# Patient Record
Sex: FEMALE | Race: WHITE | NOT HISPANIC OR LATINO | Employment: FULL TIME
[De-identification: names, ages, dates, MRNs, and addresses within clinical notes are randomized per-mention and may not be internally consistent; named-entity substitution may affect disease eponyms.]

---

## 2019-02-18 ENCOUNTER — RECORDS - HEALTHEAST (OUTPATIENT)
Dept: ADMINISTRATIVE | Facility: OTHER | Age: 29
End: 2019-02-18

## 2019-04-10 ENCOUNTER — RECORDS - HEALTHEAST (OUTPATIENT)
Dept: ADMINISTRATIVE | Facility: OTHER | Age: 29
End: 2019-04-10

## 2019-04-15 ENCOUNTER — RECORDS - HEALTHEAST (OUTPATIENT)
Dept: ADMINISTRATIVE | Facility: OTHER | Age: 29
End: 2019-04-15

## 2019-04-17 ENCOUNTER — RECORDS - HEALTHEAST (OUTPATIENT)
Dept: ADMINISTRATIVE | Facility: OTHER | Age: 29
End: 2019-04-17

## 2019-04-19 ENCOUNTER — COMMUNICATION - HEALTHEAST (OUTPATIENT)
Dept: ONCOLOGY | Facility: CLINIC | Age: 29
End: 2019-04-19

## 2019-04-22 ENCOUNTER — COMMUNICATION - HEALTHEAST (OUTPATIENT)
Dept: ONCOLOGY | Facility: CLINIC | Age: 29
End: 2019-04-22

## 2019-05-15 ENCOUNTER — OFFICE VISIT - HEALTHEAST (OUTPATIENT)
Dept: ONCOLOGY | Facility: HOSPITAL | Age: 29
End: 2019-05-15

## 2019-05-15 DIAGNOSIS — I82.402 DEEP VEIN THROMBOSIS (DVT) OF LEFT LOWER EXTREMITY, UNSPECIFIED CHRONICITY, UNSPECIFIED VEIN (H): ICD-10-CM

## 2019-05-15 DIAGNOSIS — I82.4Y2 DVT, LOWER EXTREMITY, PROXIMAL, ACUTE, LEFT (H): ICD-10-CM

## 2019-05-15 ASSESSMENT — MIFFLIN-ST. JEOR: SCORE: 1693.85

## 2019-06-07 ENCOUNTER — AMBULATORY - HEALTHEAST (OUTPATIENT)
Dept: ONCOLOGY | Facility: HOSPITAL | Age: 29
End: 2019-06-07

## 2019-06-07 DIAGNOSIS — I82.4Y2 DVT, LOWER EXTREMITY, PROXIMAL, ACUTE, LEFT (H): ICD-10-CM

## 2019-06-11 ENCOUNTER — COMMUNICATION - HEALTHEAST (OUTPATIENT)
Dept: ONCOLOGY | Facility: HOSPITAL | Age: 29
End: 2019-06-11

## 2019-06-12 ENCOUNTER — RECORDS - HEALTHEAST (OUTPATIENT)
Dept: LAB | Facility: CLINIC | Age: 29
End: 2019-06-12

## 2019-06-13 LAB
ALLERGIC TO PENICILLIN: NORMAL
GP B STREP DNA SPEC QL NAA+PROBE: NEGATIVE
T PALLIDUM AB SER QL: NEGATIVE

## 2019-06-20 ENCOUNTER — AMBULATORY - HEALTHEAST (OUTPATIENT)
Dept: INFUSION THERAPY | Facility: HOSPITAL | Age: 29
End: 2019-06-20

## 2019-06-20 ENCOUNTER — COMMUNICATION - HEALTHEAST (OUTPATIENT)
Dept: ONCOLOGY | Facility: HOSPITAL | Age: 29
End: 2019-06-20

## 2019-06-20 DIAGNOSIS — I82.4Y2 DVT, LOWER EXTREMITY, PROXIMAL, ACUTE, LEFT (H): ICD-10-CM

## 2019-06-20 LAB — UFH PPP CHRO-ACNC: 0.56 IU/ML (ref 0.3–0.7)

## 2019-06-26 ENCOUNTER — RECORDS - HEALTHEAST (OUTPATIENT)
Dept: ADMINISTRATIVE | Facility: OTHER | Age: 29
End: 2019-06-26

## 2019-06-28 ENCOUNTER — HOSPITAL ENCOUNTER (OUTPATIENT)
Dept: OBGYN | Facility: HOSPITAL | Age: 29
Discharge: HOME | End: 2019-06-28

## 2019-06-30 ENCOUNTER — ANESTHESIA - HEALTHEAST (OUTPATIENT)
Dept: OBGYN | Facility: HOSPITAL | Age: 29
End: 2019-06-30

## 2019-07-02 ENCOUNTER — HOME CARE/HOSPICE - HEALTHEAST (OUTPATIENT)
Dept: HOME HEALTH SERVICES | Facility: HOME HEALTH | Age: 29
End: 2019-07-02

## 2019-08-14 ENCOUNTER — RECORDS - HEALTHEAST (OUTPATIENT)
Dept: LAB | Facility: CLINIC | Age: 29
End: 2019-08-14

## 2019-08-16 LAB — BACTERIA SPEC CULT: NORMAL

## 2019-11-11 ENCOUNTER — COMMUNICATION - HEALTHEAST (OUTPATIENT)
Dept: ONCOLOGY | Facility: HOSPITAL | Age: 29
End: 2019-11-11

## 2019-11-12 ENCOUNTER — COMMUNICATION - HEALTHEAST (OUTPATIENT)
Dept: ONCOLOGY | Facility: HOSPITAL | Age: 29
End: 2019-11-12

## 2019-12-23 ENCOUNTER — AMBULATORY - HEALTHEAST (OUTPATIENT)
Dept: INFUSION THERAPY | Facility: HOSPITAL | Age: 29
End: 2019-12-23

## 2019-12-23 DIAGNOSIS — I82.402 DEEP VEIN THROMBOSIS (DVT) OF LEFT LOWER EXTREMITY, UNSPECIFIED CHRONICITY, UNSPECIFIED VEIN (H): ICD-10-CM

## 2019-12-24 LAB
AT III ACT/NOR PPP CHRO: 104 % (ref 85–135)
FACT VIII ACT/NOR PPP: 144 % (ref 55–200)
PROT C ACT/NOR PPP CHRO: 117 % (ref 70–170)

## 2019-12-26 LAB — LA PPP-IMP: NEGATIVE

## 2019-12-27 LAB
B2 GLYCOPROT1 IGG SERPL IA-ACNC: <0.6 U/ML
B2 GLYCOPROT1 IGM SERPL IA-ACNC: <2.9 U/ML
FACTOR 5 LEIDEN MUTAT PCR - HISTORICAL: NORMAL
PROTHROMBIN 20210A BY PCR - HISTORICAL: NORMAL

## 2019-12-30 ENCOUNTER — OFFICE VISIT - HEALTHEAST (OUTPATIENT)
Dept: ONCOLOGY | Facility: HOSPITAL | Age: 29
End: 2019-12-30

## 2019-12-30 DIAGNOSIS — I82.4Y2 DVT, LOWER EXTREMITY, PROXIMAL, ACUTE, LEFT (H): ICD-10-CM

## 2019-12-30 ASSESSMENT — MIFFLIN-ST. JEOR: SCORE: 1662.1

## 2020-03-19 ENCOUNTER — RECORDS - HEALTHEAST (OUTPATIENT)
Dept: LAB | Facility: CLINIC | Age: 30
End: 2020-03-19

## 2020-03-19 LAB
BASOPHILS # BLD AUTO: 0.1 THOU/UL (ref 0–0.2)
BASOPHILS NFR BLD AUTO: 1 % (ref 0–2)
EOSINOPHIL # BLD AUTO: 0.2 THOU/UL (ref 0–0.4)
EOSINOPHIL NFR BLD AUTO: 1 % (ref 0–6)
ERYTHROCYTE [DISTWIDTH] IN BLOOD BY AUTOMATED COUNT: 13.3 % (ref 11–14.5)
HCT VFR BLD AUTO: 44.1 % (ref 35–47)
HGB BLD-MCNC: 14 G/DL (ref 12–16)
HIV 1+2 AB+HIV1 P24 AG SERPL QL IA: NEGATIVE
INR PPP: 1.03 (ref 0.9–1.1)
LYMPHOCYTES # BLD AUTO: 1.8 THOU/UL (ref 0.8–4.4)
LYMPHOCYTES NFR BLD AUTO: 18 % (ref 20–40)
MCH RBC QN AUTO: 29.2 PG (ref 27–34)
MCHC RBC AUTO-ENTMCNC: 31.7 G/DL (ref 32–36)
MCV RBC AUTO: 92 FL (ref 80–100)
MONOCYTES # BLD AUTO: 0.7 THOU/UL (ref 0–0.9)
MONOCYTES NFR BLD AUTO: 6 % (ref 2–10)
NEUTROPHILS # BLD AUTO: 7.7 THOU/UL (ref 2–7.7)
NEUTROPHILS NFR BLD AUTO: 74 % (ref 50–70)
PLATELET # BLD AUTO: 252 THOU/UL (ref 140–440)
PMV BLD AUTO: 11.2 FL (ref 8.5–12.5)
RBC # BLD AUTO: 4.79 MILL/UL (ref 3.8–5.4)
WBC: 10.5 THOU/UL (ref 4–11)

## 2020-03-20 LAB
ABO/RH(D): NORMAL
ABORH REPEAT: NORMAL
ANTIBODY SCREEN: NEGATIVE
C TRACH DNA SPEC QL PROBE+SIG AMP: NEGATIVE
HBV SURFACE AG SERPL QL IA: NEGATIVE
N GONORRHOEA DNA SPEC QL NAA+PROBE: NEGATIVE
RUBV IGG SERPL QL IA: POSITIVE
T PALLIDUM AB SER QL: NEGATIVE

## 2020-04-23 ENCOUNTER — RECORDS - HEALTHEAST (OUTPATIENT)
Dept: LAB | Facility: CLINIC | Age: 30
End: 2020-04-23

## 2020-04-24 LAB — BACTERIA SPEC CULT: NO GROWTH

## 2020-10-19 ENCOUNTER — RECORDS - HEALTHEAST (OUTPATIENT)
Dept: LAB | Facility: CLINIC | Age: 30
End: 2020-10-19

## 2020-11-07 ENCOUNTER — HOSPITAL ENCOUNTER (OUTPATIENT)
Dept: OBGYN | Facility: HOSPITAL | Age: 30
Discharge: HOME | End: 2020-11-07
Attending: FAMILY MEDICINE | Admitting: FAMILY MEDICINE

## 2020-11-07 LAB
SARS-COV-2 PCR COMMENT: NORMAL
SARS-COV-2 RNA SPEC QL NAA+PROBE: NEGATIVE
SARS-COV-2 VIRUS SPECIMEN SOURCE: NORMAL

## 2020-11-08 ENCOUNTER — COMMUNICATION - HEALTHEAST (OUTPATIENT)
Dept: SCHEDULING | Facility: CLINIC | Age: 30
End: 2020-11-08

## 2020-11-09 ENCOUNTER — ANESTHESIA - HEALTHEAST (OUTPATIENT)
Dept: OBGYN | Facility: HOSPITAL | Age: 30
End: 2020-11-09

## 2020-12-28 ENCOUNTER — RECORDS - HEALTHEAST (OUTPATIENT)
Dept: ADMINISTRATIVE | Facility: OTHER | Age: 30
End: 2020-12-28

## 2021-05-28 NOTE — CONSULTS
Consults   [facility]ematology and Oncology Consult Note    Patient: Zulay Rodriguez  MRN: [id number]  Date of Service: 05/15/2019  Requesting provider: Dr. Kindra Henry MD.      Reason for Visit     1. Deep vein thrombosis (DVT) of left lower extremity, unspecified chronicity, unspecified vein (H)  Factor 8 Assay    Antithrombin III Activity    Lupus Anticoagulant    Protein C Activity    Factor V Leiden    Prothrombin Gene Mutation    Beta-2 Glycoprotein 1 Antibodies,IgG/IgM   2. DVT, lower extremity, proximal, acute, left (H)           Assessment     1.  A very pleasant 28-year-old woman with a DVT in the left lower extremity while pregnant.  2.  Expected date of delivery is July 2019.  3.  No family history of hypercoagulable state.  4.  Otherwise good general health.  5.  Currently on Lovenox.    Plan     1.  At this time continue Lovenox.  I discussed her case with Dr. Kindra Henry.  Dr. Henry.  does plan to change her to heparin when she reaches 36 to 37 weeks of her pregnancy.  This will allow the patient to undergo any epidural if she needs to.  I did discuss with the patient that she will need to be on anticoagulation for 6 months postpartum.  This is because her maximum risk of clot recurrence is 6 to 12 weeks post delivery.  After she is finished about 6 months of anticoagulation then I will have her come back and we will do a hypercoagulable work-up in the end of December.  Then we will see her back after that.  2.  We did discuss that postpartum she can be on Coumadin if she chooses to.  3.  Follow-up with me after hypercoagulable work-up was done in December 2019.      History     Patient is a very pleasant 28-year-old woman who has been referred to me by Dr. Kindra Henry from [facility] for evaluation of hypercoagulable state.  The patient was diagnosed to have a DVT located in her left leg on 14 April 2019 when she presented with some increasing swelling and pain.  She had taken a  plane trip few weeks prior to that to go down to [place].  There was no pitting edema.  No evidence of any pulmonary embolism.  Ultrasound showed that she had left external iliac vein DVT which was extending up to distal superficial femoral vein.  There was an occlusive thrombus seen in the left external iliac vein.  No other DVT noted.  She was seen in the emergency room and then started on Lovenox after that.    After starting Lovenox her swelling rapidly improved.  Dr. Kindra Henry. asked her to come here for hypercoagulable work-up.  Currently patient is doing quite well.  No bleeding issues.    Past Medical History     Past Medical History:   Diagnosis Date     H/O blood clots 04/19/2019       Past Social History     Social History     Socioeconomic History     Marital status:      Spouse name: Not on file     Number of children: Not on file     Years of education: Not on file     Highest education level: Not on file   Occupational History     Occupation: Sales   Social Needs     Financial resource strain: Not on file     Food insecurity:     Worry: Not on file     Inability: Not on file     Transportation needs:     Medical: Not on file     Non-medical: Not on file   Tobacco Use     Smoking status: Never Smoker     Smokeless tobacco: Never Used   Substance and Sexual Activity     Alcohol use: Yes     Comment:  1-2 a week not during pregnancy     Drug use: Never     Sexual activity: Never   Lifestyle     Physical activity:     Days per week: Not on file     Minutes per session: Not on file     Stress: Not on file   Relationships     Social connections:     Talks on phone: Not on file     Gets together: Not on file     Attends Congregational service: Not on file     Active member of club or organization: Not on file     Attends meetings of clubs or organizations: Not on file     Relationship status: Not on file     Intimate partner violence:     Fear of current or ex partner: Not on file     Emotionally  abused: Not on file     Physically abused: Not on file     Forced sexual activity: Not on file   Other Topics Concern     Not on file   Social History Narrative     Not on file       Family History     Family History   Problem Relation Age of Onset     Cancer Maternal Uncle      Colon cancer Maternal Uncle      Cancer Maternal Grandmother      Breast cancer Maternal Grandmother      Cancer Maternal Uncle      Lymphoma Maternal Uncle      No Medical Problems Mother      Hypertension Father      No Medical Problems Sister      No Medical Problems Brother        Medication List     Prior to Admission medications    Medication Sig       enoxaparin (LOVENOX) 100 mg/mL Syrg Inject 0.9 mL (90 mg total) under the skin every 12 (twelve) hours.  Patient taking differently: Inject 100 mg under the skin every 12 (twelve) hours.              prenatal vit calc,iron,folic (PRENATAL VITAMIN ORAL) Take 1 tablet by mouth daily.           Allergies     No Known Allergies    Review of Systems   A comprehensive review of 14 systems was done. Pertinent findings noted here and in history of present illness. All the rest negative.     General  General (WDL): Exceptions to WDL(pregnancy related)  Fatigue: Yes - Recent (Less than 3 months)  Fever: None  Generalized Muscle Weakness: None  Weight Loss: No  ENT  ENT (WDL): All ENT elements are within defined limits  Vertigo (Dizziness): None  Respiratory  Respiratory (WDL): All respiratory elements are within defined limits  Cardiovascular  Cardiovascular (WDL): All cardiovascular elements are within defined limits  Endocrine  Endocrine (WDL): All endocrine elements are within defined limits  Gastrointestinal  Gastrointestinal (WDL): Exceptions to WDL(pregnancy related)  Difficulty Swallowing: None  Heartburn: Yes - Recent (Less than 3 months)  Constipation: Yes - Recent (Less than 3 months)  Yellowish skin and/or eyes: None  Blood from rectum: None  Nausea and Vomiting: None  Abdominal Pain:  None  Diarrhea: None  Have had black or tan stools?: None  Hemorrhoids: Yes - Recent (Less than 3 months)  Poor Appetite: None  Musculoskeletal  Musculoskeletal (WDL): Exceptions to WDL(pregnancy related)  Range of Motion Limitation: None  Joint pain: None  Back Pain: Yes - Recent (Less than 3 months)  Difficulty to lie flat for more than 30 minutes: None  Pain interfering with walking: None  Muscle pain or stiffness: None  Recent fall: None  Assistive device: None  Neurological  Neurological (WDL): Exceptions to WDL(occasional migraines/headaches)  History of LOC?: None  Headaches: Yes - Recent (Less than 3 months)  Difficulty walking: None  Difficulty with speech: None  Difficulty with memory: None  Vertigo (Dizziness): None  Dominant Hand: Right  Seizures: None  Difficulty with Balance: None  Numbness and/or tingling: None  Psychological/Emotional  Psychological/Emotional (WDL): All psychological/emotional elements are within defined limits  Hematological/Lymphatic  Hematological/Lymphatic (WDL): All hematological/lymphatic elements are within defined limits  Dermatological  Dermatologic (WDL): All dermatological elements are within defined limits  Genitourinary/Reproductive  Genitourinary/Reproductive (WDL): Exceptions to WDL(pregnancy related)  Urinary Frequency: Yes - Recent (Less than 3 months)  Urinary Incontinence: None  Painful urination: None  Urination more than 2 times a night: None  Urinary Urgency: None  Difficulty Initiating Urine Stream: None  Blood in urine: None  Sensation of incomplete emptying of bladder: None  Sexual concerns: None  Reproductive (Females only)  Menstrual irritation or increase in discharge: No  Age at start of periods: 16  Last menstural cycle: 9/29/2018  Number of pregnancies: 1(current pregnancy)  Age at first pregnancy: 28  Patient Pregnant?: Yes  Is the patient trying to get pregnant?: No  Is the patient on birth control: No  Pain  Currently in Pain: Yes  Pain Score  "(Initial OR Reassessment): 3  Pain Frequency: Constant/continuous  Location: left calf due to blood clot  Pain Characteristics : Tightness;Sharp;Aching  Pain Intervention(s): Heat applied;Elevated  Response to Interventions: brings pain down to 2 on pain scale    Physical Exam     Recent Vitals 5/15/2019   Height 5' 11\"   Weight 192 lbs 6 oz   BSA (m2) 2.09 m2   /64   Pulse 74   Temp 97.9   Temp src 1   SpO2 98       Constitutional: Oriented to person, place, and time and appears well-developed.   HEENT:  Normocephalic and atraumatic.  Eyes: Conjunctivae and EOM are normal. Pupils are equal, round, and reactive to light. No discharge.  No scleral icterus. Nose normal. Mouth/Throat: Oropharynx is clear and moist. No oropharyngeal exudate.    NECK: Normal range of motion. Neck supple. No JVD present. No tracheal deviation present. No thyromegaly present.   CARDIOVASCULAR: Normal rate, regular rhythm and intact distal pulses.  Exam reveals no gallop and no friction rub.  Systolic murmur present.  PULMONARY: Effort normal and breath sounds normal. No respiratory distress.No Wheezing or rales.  ABDOMEN: Soft. Bowel sounds are normal. No distension and no mass.  There is no tenderness. There is no rebound and no guarding. No HSM.  MUSCULOSKELETAL: Normal range of motion. No edema and no tenderness. Mild kyphosis, no tenderness.  LYMPH NODES: Has no cervical, supraclavicular, axillary and groin adenopathy.   NEUROLOGICAL: Alert and oriented to person, place, and time. No cranial nerve deficit.  Normal muscle tone. Coordination normal.   GENITOURINARY: Deferred exam.  SKIN: Skin is warm and dry. No rash noted. No erythema. No pallor.   EXTREMITIES: No cyanosis, no clubbing, no edema. No Deformity.  PSYCHIATRIC: Normal mood, affect and behavior.      Lab Results       Reviewed labs done in the entire clinic.  She had hemoglobin of 11.1.  Urine pregnancy test positive.  Rest of the labs were negative.      Imaging " Results     Study Result     EXAM: US VENOUS LEG LEFT  LOCATION: [facility]  DATE/TIME: 4/14/2019 3:32 PM     INDICATION: Leg swelling. Pregnant.  COMPARISON: None.  TECHNIQUE: Routine exam without and with compression, augmentation, and duplex utilizing 2D gray-scale imaging, Doppler interrogation with color-flow and spectral waveform analysis.     FINDINGS: The common femoral, femoral, popliteal, and segmentally visualized calf veins were evaluated. The opposite CFV was also included in the evaluation.     Left leg veins are positive for DVT. Occlusive thrombus seen in the left external iliac vein through the distal superficial femoral vein. No other DVT. Portions of the visualized IVC appears to have flow, though not well evaluated. The right external   iliac vein and common femoral vein show no filling defect. No popliteal cysts.     IMPRESSION:   CONCLUSION:  1.  Left leg veins are positive for occlusive DVT in the left external iliac vein through distal superficial femoral vein.                  Total time spent was 60 minutes, more than half of it was in face-to-face counseling regarding disease state, review of available images, laboratory values, pathological reports, treatment, options, their side effects and management.  I also discussed the case with Dr. Kindra Henry.    Víctor Peter MD

## 2021-05-28 NOTE — TELEPHONE ENCOUNTER
Attempt made to call Zulay to help her schedule Heme consult as requested by her OB.  LMOM for her to return my call to schedule.

## 2021-05-28 NOTE — TELEPHONE ENCOUNTER
I reached Zulay to help schedule a heme consult per her OB MD, for  Blood clot in pregnancy.  She has been referred specifically to Dr. Peter.  Her JONG is 7/5/19. I have scheduled her to come see Dr. Peter on Weds, 5-15-19 at  at 1130/1100 check in. I have explained what to expect at the appt. I have notified the referring provider of the appt date and time and provider. I will mail pt a welcome letter with appt details as well as HH form to complete and medication/ allergy list to review and update. I have instructed her to bring these to the appointment.      I have given Zulay my contact information and invited calls.

## 2021-05-29 NOTE — TELEPHONE ENCOUNTER
Per Dr Peter, she is to stop the heparin 24 hours prior to her induction.  So he wants her last dose of heparin to be the evening dose on Wednesday 6/26/19.  However, if she starts to have contractions prior to her induction, she needs to stop the heparin immediately.  She is then supposed to start her lovenox injections 24 hours post delivery, or as otherwise directed by her OB doctor, in case of any complications.  She was told that she will need to contact a coumadin clinic to start and monitor her coumadin that she will start after her lovenox injections and take for 6 months.  She verbalized understanding and has asked that we forward this information along to her PCP, Dr Zeynep Crawford.  I have done this today.    Gayla Mark RN

## 2021-05-29 NOTE — TELEPHONE ENCOUNTER
Zulay calls back.  I let her know that her level was good.  She is being induced on 6/28/19.  She wants to know when she should stop her heparin injections before the induction.  She then also wants to know if she should go back on heparin after she delivers or if not, when to start the lovenox after delivery. Then on lovenox for 2 weeks and then switching to coumadin.   I let her know that I will talk with him and call her back.  This may be tomorrow that I call her and she was fine with that.    Gayla Mark RN

## 2021-05-29 NOTE — TELEPHONE ENCOUNTER
Patient was in today for labs.  Dr Peter has reviewed the results.  She had the anti-xa heparin level drawn.  It is normal.  He is happy to see this.  He wants to know exactly when she is due and when she plans to stop the lovenox and start the coumadin.    I called and left her a brief message asking her to call back.  Will await a return call.    Gayla Mark RN

## 2021-05-30 NOTE — ANESTHESIA POSTPROCEDURE EVALUATION
"Patient: Zulay Rodriguez  * No procedures listed *  Anesthesia type: epidural    Patient location: Labor and Delivery   /63 (Patient Position: Semi-ocampo, Cuff Size: Adult Regular)   Pulse 78   Temp 36.8  C (98.2  F) (Oral)   Resp 16   Ht 5' 11\" (1.803 m)   Wt 211 lb (95.7 kg)   SpO2 96%   Breastfeeding? Unknown   BMI 29.43 kg/m    CNS normal. No post dural puncture headache. No noted or reported complications of labor epidural.    "

## 2021-05-30 NOTE — ANESTHESIA PREPROCEDURE EVALUATION
Anesthesia Evaluation      Patient summary reviewed   No history of anesthetic complications     Airway   Mallampati: I  Neck ROM: full   Pulmonary - negative ROS and normal exam                          Cardiovascular - normal exam  Exercise tolerance: > or = 4 METS  (+) , PVD     Neuro/Psych - negative ROS     Endo/Other    (+) pregnant     GI/Hepatic/Renal - negative ROS      Other findings: H/o DVT at 28 weeks, put on SQ heparin, off now for 4-5 days.      Dental - normal exam                        Anesthesia Plan  Planned anesthetic: epidural    ASA 3     Anesthetic plan and risks discussed with: patient    Post-op plan: routine recovery

## 2021-05-30 NOTE — ANESTHESIA PROCEDURE NOTES
Epidural Block    Patient location during procedure: OB  Time Called: 6/30/2019 1:24 PM  Reason for Block:labor epidural  Staffing:  Performing  Anesthesiologist: Raphael Murguia MD  Preanesthetic Checklist  Completed: patient identified, risks, benefits, and alternatives discussed, timeout performed, consent obtained, airway assessed, oxygen available, suction available, emergency drugs available and hand hygiene performed  Procedure  Patient position: left lateral decubitus  Prep: ChloraPrep  Patient monitoring: continuous pulse oximetry, heart rate and blood pressure  Approach: midline  Location: L2-L3  Epidural technique: JOSE CRUZ to local.  Number of Attempts:1  Needle  Needle type: Bill   Needle gauge: 18 G     Catheter in Space: 4  Assessment  Sensory level:  No complicationsSensory level: not assessed at this time.

## 2021-06-03 VITALS — BODY MASS INDEX: 26.94 KG/M2 | HEIGHT: 71 IN | WEIGHT: 192.4 LBS

## 2021-06-03 NOTE — TELEPHONE ENCOUNTER
GENESIS Pinedo from [facility] calls in today from Dr. Crawford's office to follow-up and see when patient is supposed to stop her Coumadin prior to getting her labs drawn for her appointment with Dr. Peter at the end of December.  I sat down with Dr. Peter to review his last note and let him know when she is coming in the next.  She has an appointment to see him on December 30 so he would like her to stop her Coumadin on December 8 and have her labs drawn on December 23.  Patient would like to have her labs drawn at her PCP office so I will fax these orders over to them.  Patient currently has an ointment for lab scheduled 12/9 but will have this rescheduled until 12/23.  Shahida states that she will call back if they have future concerns.    Gayla Mark RN

## 2021-06-04 VITALS
HEIGHT: 71 IN | DIASTOLIC BLOOD PRESSURE: 59 MMHG | HEART RATE: 81 BPM | BODY MASS INDEX: 25.96 KG/M2 | SYSTOLIC BLOOD PRESSURE: 114 MMHG | WEIGHT: 185.4 LBS | OXYGEN SATURATION: 97 %

## 2021-06-04 NOTE — PROGRESS NOTES
Patient is here today for provider visit for.Deep vein thrombosis (DVT) of left lower extremity, unspecified chronicity, unspecified vein.

## 2021-06-04 NOTE — PROGRESS NOTES
[facility] Progress Note    Patient: Zulay Rodriguez  MRN: [id number]  Date of Service: 12/30/2019          Reason for visit      1. DVT, lower extremity, proximal, acute, left (H)         Assessment     1.  A very pleasant 29-year-old woman with history of pregnancy-induced DVT.  2.  Family history of similar pregnancy-induced DVT in her sister.  3.  Hypercoagulable work-up however negative for any detectable hypercoagulability.  4.  She has finished almost 9 months of anticoagulation.  Currently asymptomatic.    Plan     1.  At this time she does not need to be on anticoagulation.  I had a lengthy discussion with the patient that she is definitely at a high risk of DVT and baseline population.  That risk will continue to increase with age.  She needs to take special precautions when she is hospitalized, immobilized or has a cast put on for immobilization.  She will need to be prophylaxed as a very high risk individual.  Obviously if she gets pregnant again she will need to start anticoagulation with Lovenox right away.  She will need to continue anticoagulation for about 3 months postpartum.    Also cautioned her about birth control medications.  She is currently on progestin only medication which apparently has a low risk of DVT although it is not completely safe.  So far she has done well so she can continue.    Continue good diet and exercise.  Avoid smoking gaining too much weight etc.    Discussed with patient that if she has another provoked or unprovoked blood clot she will need to be on anticoagulation indefinitely.    Clinical stage      Cancer Staging  No matching staging information was found for the patient.    History     Zulay Rodriguez is a very pleasant 29 y.o. old female with a history of DVT located in her left leg diagnosed in April 2019 when she presented with some increased swelling and pain.  She was also pregnant at the time and she had also taken a trip down to [place] and  back few weeks prior to that.  Ultrasound confirmed left external iliac vein DVT extending up to distal superficial vein.  She was started on Lovenox and she continued that until she had her delivery.  Then she had been on Coumadin for few months.    She stopped Coumadin earlier this month.  She comes in after having her hypercoagulable work-up done.    Overall she is doing quite well.  No new issues.    Past Medical History     Past Medical History:   Diagnosis Date     H/O blood clots 04/19/2019           Review of Systems   Constitutional  Constitutional (WDL): All constitutional elements are within defined limits  Neurosensory  Neurosensory (WDL): All neurosensory elements are within defined limits  Eye   Eye Disorder (WDL): All eye disorder elements are within defined limits  Ear  Ear Disorder (WDL): All ear disorder elements are within defined limits  Cardiovascular  Cardiovascular (WDL): All cardiovascular elements are within defined limits  Pulmonary  Respiratory (WDL): Within Defined Limits  Gastrointestinal  Gastrointestinal (WDL): All gastrointestinal elements are within defined limits  Genitourinary  Genitourinary (WDL): All genitourinary elements are within defined limits  Lymphatic  Lymph (WDL): All lymph disorder elements are within defined limits  Musculoskeletal and Connective Tissue  Musculoskeletal and Connetive Tissue Disorders (WDL): All Musculoskeletal and Connetive Tissue Disorder elements are within defined limits  Integumentary  Integumentary (WDL): All integumentary elements are within defined limits  Patient Coping  Patient Coping: Accepting  Accompanied by  Accompanied by: Alone  Oral Chemo Adherence       Constitutional     Neurosensory     Cardiovascular     Pulmonary     Gastrointestinal     Genitourinary     Integumentary     Patient Coping  Patient Coping: Accepting  Accompanied by  Accompanied by: Alone    ECOG performance status and Distress Assessment      ECOG Performance:     ECOG Performance Status: 0    Distress Assessment  Distress Assessment Score: 2:     Pain Status  Currently in Pain: No/denies        Vital Signs     Vitals:    12/30/19 1417   BP: 114/59   Pulse: 81   SpO2: 97%       Physical Exam     GENERAL: no acute distress. Cooperative in conversation.   HEENT: pupils are equal, round and reactive. Oral mucosa is moist and intact.  RESP:Chest symmetric. Regular respiratory rate. No stridor.  ABD: Nondistended, soft.  EXTREMITIES: No lower extremity edema.   NEURO: non focal. Alert and oriented x3.   PSYCH: within normal limits. No depression or anxiety.  SKIN: warm dry intact       Lab Results     Results for orders placed or performed in visit on 12/23/19   Factor 8 Assay   Result Value Ref Range    Factor 8 Assay 144 55 - 200 %   Antithrombin III Activity   Result Value Ref Range    Antithrombin III Chromogen 104 85 - 135 %   Lupus Anticoagulant   Result Value Ref Range    Lupus Result Negative NEG   Protein C Activity   Result Value Ref Range    Protein C Chromogenic 117 70 - 170 %   Factor V Leiden   Result Value Ref Range    Factor 5 Leiden Mutat PCR Results Below    Prothrombin Gene Mutation   Result Value Ref Range    Prothrombin 96472M by PCR Results Below    Beta-2 Glycoprotein 1 Antibodies,IgG/IgM   Result Value Ref Range    Beta-2 Glycoprotein IgG <0.6 <7 U/mL    Beta-2 Glycoprotein IgM <2.9 <7 U/mL         Imaging Results     No results found.      Víctor Peter MD

## 2021-06-12 NOTE — ANESTHESIA PROCEDURE NOTES
Epidural Block    Patient location during procedure: OB  Time Called: 11/9/2020 9:52 PM  Reason for Block:labor epidural  Staffing:  Performing  Anesthesiologist: Dave Hurst MD  Preanesthetic Checklist  Completed: patient identified, risks, benefits, and alternatives discussed, timeout performed, consent obtained, at patient's request, airway assessed, oxygen available, suction available, emergency drugs available and hand hygiene performed  Procedure  Patient position: sitting  Prep: ChloraPrep and site prepped and draped  Patient monitoring: continuous pulse oximetry, heart rate and blood pressure  Approach: midline  Location: L3-L4  Injection technique: JOSE CRUZ saline  Number of Attempts:1  Needle  Needle type: Tuohy   Needle gauge: 17 G     Catheter in Space: 5  Assessment  Sensory level:  No complications      Additional Notes:  JOSE CRUZ @ 7 cm, taped @ 12 cm

## 2021-06-12 NOTE — ANESTHESIA PREPROCEDURE EVALUATION
Anesthesia Evaluation      Patient summary reviewed   No history of anesthetic complications     Airway   Mallampati: II   Pulmonary - negative ROS and normal exam                          Cardiovascular - normal exam     ROS comment: Hx of DVT during previous pregnancy. Was taking Lovenox during pregnancy, switched to Heparin subcutaneous recently, last dose was 24 hrs ago.     Neuro/Psych - negative ROS     Endo/Other    (+) pregnant     Comments: Anemia    GI/Hepatic/Renal - negative ROS      Other findings: Lab Results      Component                Value               Date                       WBC                      7.4                 11/09/2020                 HGB                      10.9 (L)            11/09/2020                 HCT                      34.3 (L)            11/09/2020                 MCV                      87                  11/09/2020                 PLT                      192                 11/09/2020                  Dental - normal exam                        Anesthesia Plan  Planned anesthetic: epidural  Patient requesting labor epidural. Patient interviewed and examined at the bedside with RN present throughout. Discussed with patient the procedure of epidural placement, expectations, and risks including but not limited to: decreased blood pressure, poor analgesia possibly requiring replacement, post-dural puncture headache, bleeding, infection, nerve damage, and high spinal block. All questions answered. Patient consents to proceed with epidural placement.    ASA 3     Anesthetic plan and risks discussed with: patient and spouse    Post-op plan: routine recovery

## 2021-06-13 NOTE — ANESTHESIA POSTPROCEDURE EVALUATION
Patient: Zulay Rodriguez  * No procedures listed *  Anesthesia type: epidural    Patient location: Labor and Delivery  Last vitals: No vitals data found for the desired time range.    Post vital signs: stable  Level of consciousness: awake and responds to simple questions  Post-anesthesia pain: pain controlled  Post-anesthesia nausea and vomiting: no  Pulmonary: unassisted, return to baseline  Cardiovascular: stable and blood pressure at baseline  Hydration: adequate  Anesthetic events: no    QCDR Measures:  ASA# 11 - Aide-op Cardiac Arrest: ASA11B - Patient did NOT experience unanticipated cardiac arrest  ASA# 12 - Aide-op Mortality Rate: ASA12B - Patient did NOT die  ASA# 13 - PACU Re-Intubation Rate: NA - No ETT / LMA used for case  ASA# 10 - Composite Anes Safety: ASA10A - No serious adverse event    Additional Notes:  Denies headache and point back tenderness. No issues with ambulation and urination.

## 2021-06-16 PROBLEM — I82.4Y2 DVT, LOWER EXTREMITY, PROXIMAL, ACUTE, LEFT (H): Status: ACTIVE | Noted: 2019-05-15

## 2021-06-19 NOTE — LETTER
Letter by Lombardi, Susan L, RN at      Author: Lombardi, Susan L, RN Service: -- Author Type: --    Filed:  Encounter Date: 4/22/2019 Status: (Other)       Dear Zulay,    Thank you for choosing [facility] for your care.  We are committed to providing you with the highest quality and compassionate healthcare services.  The following information pertains to your first appointment with our clinic.    Date/Time of appointment: Wednesday, 5-15-19 at 11:30 am, check in at 11:00 am.    Note: Please arrive at 11:00 am.  This allows time to complete forms, possible labs and nursing assessment.     Name of your Physician: Víctor Peter MD    What to bring to your appointment:    Completed Patient History/Initial Nursing Assessment and Medication/Allergy List (these forms were sent to you).    Any paperwork or films from your physician that we have asked you to bring.    Your current insurance card(s).    Parking:    Please refer to the map included to direct you.  The [facility] is located at the [address] end of [facility] in [place], [place].      After turning onto [address] from [facility], take a right turn at the first stop sign.  We have designated parking on the left, identified as parking for Cancer Care patients (Lot D).     The Code to Enter Lot D is: 0501. This code changes monthly and will always coincide with the current month followed by 01.  If lot D is full please use Parking Lot A, directly across the street.    Please enter the [facility] Center on the north end of the hospital.  You will see a sign on the building.        For Medical Oncology or Hematology appointments, please take the elevator to the second floor to check in.     Also please note appointments can last 1.5-2 hours.      We hope these instructions are helpful to you.  If you have any questions or concerns, please call us at [phone].  It is our pleasure to assist you.    Warm Regards,  Gaby PATRICK  Lombardi  Nurse Navigator  [phone]

## 2021-07-14 PROBLEM — Z34.90 PREGNANT: Status: RESOLVED | Noted: 2019-06-30 | Resolved: 2019-07-02

## 2021-07-14 PROBLEM — Z34.90 PREGNANCY: Status: RESOLVED | Noted: 2019-06-28 | Resolved: 2019-07-02

## 2021-07-14 PROBLEM — Z34.90 PREGNANT: Status: RESOLVED | Noted: 2020-11-09 | Resolved: 2020-11-11

## 2021-08-23 ENCOUNTER — LAB REQUISITION (OUTPATIENT)
Dept: LAB | Facility: CLINIC | Age: 31
End: 2021-08-23
Payer: COMMERCIAL

## 2021-08-23 DIAGNOSIS — R51.9 HEADACHE, UNSPECIFIED: ICD-10-CM

## 2021-08-23 PROCEDURE — U0005 INFEC AGEN DETEC AMPLI PROBE: HCPCS | Mod: ORL | Performed by: FAMILY MEDICINE

## 2021-08-24 LAB — SARS-COV-2 RNA RESP QL NAA+PROBE: NEGATIVE

## 2022-04-17 ENCOUNTER — HOSPITAL ENCOUNTER (EMERGENCY)
Facility: HOSPITAL | Age: 32
Discharge: HOME | End: 2022-04-17
Attending: EMERGENCY MEDICINE | Admitting: EMERGENCY MEDICINE
Payer: COMMERCIAL

## 2022-04-17 VITALS
BODY MASS INDEX: 25.2 KG/M2 | OXYGEN SATURATION: 96 % | HEART RATE: 75 BPM | RESPIRATION RATE: 16 BRPM | WEIGHT: 180 LBS | HEIGHT: 71 IN | TEMPERATURE: 97.8 F | SYSTOLIC BLOOD PRESSURE: 115 MMHG | DIASTOLIC BLOOD PRESSURE: 69 MMHG

## 2022-04-17 DIAGNOSIS — T14.8XXA SKIN AVULSION: ICD-10-CM

## 2022-04-17 DIAGNOSIS — Z79.01 ANTICOAGULATED BY ANTICOAGULATION TREATMENT: ICD-10-CM

## 2022-04-17 PROCEDURE — 272N000004 HC RX 272: Performed by: EMERGENCY MEDICINE

## 2022-04-17 PROCEDURE — 250N000009 HC RX 250: Performed by: EMERGENCY MEDICINE

## 2022-04-17 PROCEDURE — 12002 RPR S/N/AX/GEN/TRNK2.6-7.5CM: CPT

## 2022-04-17 PROCEDURE — 99283 EMERGENCY DEPT VISIT LOW MDM: CPT

## 2022-04-17 RX ORDER — TRANEXAMIC ACID 100 MG/ML
INJECTION, SOLUTION INTRAVENOUS ONCE
Status: DISCONTINUED | OUTPATIENT
Start: 2022-04-17 | End: 2022-04-17 | Stop reason: HOSPADM

## 2022-04-17 RX ORDER — TRANEXAMIC ACID 100 MG/ML
INJECTION, SOLUTION INTRAVENOUS ONCE
Status: COMPLETED | OUTPATIENT
Start: 2022-04-17 | End: 2022-04-17

## 2022-04-17 RX ADMIN — Medication: at 13:00

## 2022-04-17 RX ADMIN — TRANEXAMIC ACID: 1 INJECTION, SOLUTION INTRAVENOUS at 13:00

## 2022-04-17 RX ADMIN — Medication: at 12:00

## 2022-04-17 ASSESSMENT — ENCOUNTER SYMPTOMS: WOUND: 1

## 2022-04-17 NOTE — DISCHARGE INSTRUCTIONS
Read and follow the discharge instructions.    Try to keep the finger covered as long as you can.    Call your primary care doctor tomorrow to make a follow-up appointment for recheck on Friday    Return if the bleeding returns you have fever redness or any other concerns.

## 2022-04-17 NOTE — ED PROVIDER NOTES
EMERGENCY DEPARTMENT ENCOUNTER      NAME: Zulay Rodriguez  AGE: 31 year old female  YOB: 1990  MRN: [id number]  EVALUATION DATE & TIME: No admission date for patient encounter.    PCP: Zeynep Crawford    ED PROVIDER: Minerva Marie M.D.      CHIEF COMPLAINT     Chief Complaint   Patient presents with     Hand Injury         FINAL IMPRESSION:     1. Skin avulsion    2. Anticoagulated by anticoagulation treatment          MEDICAL DECISION MAKING:       Pertinent Labs & Imaging studies reviewed. (See chart for details)    31 year old female presents to the Emergency Department for evaluation of cut to right thumb finger    Right-handed female was using a mandolin.  She accidentally caught her right thumb.  Immediate bleeding.  No other injuries.  She is anticoagulated for history of pulmonary embolism.  Her tetanus is up-to-date per her records.    On exam she is well.  Tearful but in good spirits she is here with her  who is very caring.    She has a skin avulsion on her right thumb.  Actively bleeding.  Capillary refill less than 2 seconds  2+ radial pulse intact sensation.  The nail is not involved.    The wound was irrigated with normal saline.  I attempted Surgicel but unfortunately patient bled through the Surgicel.  A cotton soaked TXA was placed on the wound bleeding and stop the cotton was left in place and a bandage was placed.  This is a skin avulsion and there is no ability to suture.  The plan is to leave the TXA to stop bleeding which currently have stop allow to be there for a few days and then remove it.  Patient felt comfortable going home all questions answered strict wound instructions given.    Patient discharged ambulatory in stable condition.    Differential Diagnosis (include but not limited to)  Coagulopathy knee injury bony injury injury among others.    Vital Signs: reviewed  EKG: none  Imaging: none  Home Meds: reviewed  ED meds/abx: TXA  Fluids:  none    Labs  none        Review of Previous Records    Patient is on Xarelto.  Tdap 10/2/2020    Consults      ED COURSE     11:20 AM Initial history and physical performed. Plan of care discussed. PPE utilized includes N95 mask and gloves.     At the conclusion of the encounter I discussed the results of all of the tests and the disposition. The questions were answered. The patient and   acknowledged understanding and was agreeable with the care plan.           MEDICATIONS GIVEN IN THE EMERGENCY:     Medications   tranexamic acid (CYKLOKAPRON) TOPICAL (injection used topically) (has no administration in time range)   oxidized cellulose (SURGICEL) pad ( Topical Given 4/17/22 1200)   tranexamic acid (CYKLOKAPRON) TOPICAL (injection used topically) ( Topical Given 4/17/22 1300)   oxidized cellulose (SURGICEL) pad ( Topical Given 4/17/22 1300)       NEW PRESCRIPTIONS STARTED AT TODAY'S ER VISIT     New Prescriptions    No medications on file          =================================================================    HPI     Patient information was obtained from: Patient    Use of : N/A       Zluay Rodriguez is a 31 year old female with a pertinent history of DVT who presents by walk in for evaluation of a laceration.     Patient was using a mandolin when she cut her right thumb just prior to arrival in the ED. Patient is right handed. Patient is up to date on her tetanus vaccine. Patient is on xarelto, an anticoagulant.     Patient denies any other concerns at this time.    REVIEW OF SYSTEMS   Review of Systems   Skin: Positive for wound.   All other systems reviewed and are negative.       PAST MEDICAL HISTORY:     Past Medical History:   Diagnosis Date     H/O blood clots 04/19/2019       PAST SURGICAL HISTORY:   No past surgical history on file.      CURRENT MEDICATIONS:   acetaminophen (TYLENOL) 325 MG tablet  docusate sodium (COLACE) 100 MG capsule  enoxaparin (LOVENOX) 100 mg/mL  "Syrg  prenatal vit calc,iron,folic (PRENATAL VITAMIN ORAL)         ALLERGIES:   No Known Allergies    FAMILY HISTORY:     Family History   Problem Relation Age of Onset     Cancer Maternal Uncle      Colon Cancer Maternal Uncle      Cancer Maternal Grandmother      Breast Cancer Maternal Grandmother      Cancer Maternal Uncle      Lymphoma Maternal Uncle      No Known Problems Mother      Hypertension Father      No Known Problems Sister      No Known Problems Brother        SOCIAL HISTORY:     Social History     Socioeconomic History     Marital status:    Tobacco Use     Smoking status: Never Smoker     Smokeless tobacco: Never Used   Substance and Sexual Activity     Alcohol use: Yes     Comment: Alcoholic Drinks/day:  1-2 a week not during pregnancy     Drug use: Never     Sexual activity: Yes       VITALS:   /69   Pulse 75   Temp 97.8  F (36.6  C) (Oral)   Resp 16   Ht 1.803 m (5' 11\")   Wt 81.6 kg (180 lb)   SpO2 96%   BMI 25.10 kg/m      PHYSICAL EXAM     Physical Exam  Vitals and nursing note reviewed. Exam conducted with a chaperone present.   Constitutional:       Appearance: Normal appearance.   Musculoskeletal:        Arms:    Neurological:      Mental Status: She is alert.         Physical Exam   Constitutional: Well appearing. Cooperative and pleasant on exam.      Cardiovascular: Normal rate, regular rhythm and normal heart sounds.      Pulmonary/Chest: Normal effort  and breath sounds normal.     Musculoskeletal: Normal range of motion.     LAB:     All pertinent labs reviewed and interpreted.  Labs Ordered and Resulted from Time of ED Arrival to Time of ED Departure - No data to display     RADIOLOGY:     Reviewed all pertinent imaging. Please see official radiology report.  No orders to display        EKG:       I have independently reviewed and interpreted the EKG(s) documented above.      PROCEDURES:     Procedures  PROCEDURE: Laceration Repair   INDICATIONS: Laceration "   PROCEDURE PROVIDER: Dr Minerva Marie   SITE: Right thumb   TYPE/SIZE: simple, clean and no foreign body visualized  3 cm (total length)   FUNCTIONAL ASSESSMENT: Distal sensation, circulation and motor intact   MEDICATION: none   PREPARATION: irrigation with Normal saline   DEBRIDEMENT: no debridement   CLOSURE:  Wound was closed in   TXA Closed              I, Romero Jane, am serving as a scribe to document services personally performed by Dr. Marie based on my observation and the provider's statements to me. I, Minerva Marie MD attest that Romero Jane is acting in a scribe capacity, has observed my performance of the services and has documented them in accordance with my direction.    Minerva Marie M.D.  Emergency Medicine  [facility] EMERGENCY DEPARTMENT  [facility] [facility] [id number] [phone]  Dept: [phone]      Minerva Marie MD  04/17/22 0449

## 2022-04-17 NOTE — ED TRIAGE NOTES
Patient sliced her right thumb on a slicing mandolin.  Bleeding under control. Tetanus unknown.  Patient does take xerolto.

## 2023-04-28 ENCOUNTER — LAB REQUISITION (OUTPATIENT)
Dept: LAB | Facility: CLINIC | Age: 33
End: 2023-04-28
Payer: COMMERCIAL

## 2023-04-28 DIAGNOSIS — N91.2 AMENORRHEA, UNSPECIFIED: ICD-10-CM

## 2023-04-28 LAB
ABO/RH(D): NORMAL
ANTIBODY SCREEN: NEGATIVE
BASOPHILS # BLD AUTO: 0.1 10E3/UL (ref 0–0.2)
BASOPHILS NFR BLD AUTO: 1 %
EOSINOPHIL # BLD AUTO: 0.1 10E3/UL (ref 0–0.7)
EOSINOPHIL NFR BLD AUTO: 2 %
ERYTHROCYTE [DISTWIDTH] IN BLOOD BY AUTOMATED COUNT: 12.7 % (ref 10–15)
HCT VFR BLD AUTO: 41.9 % (ref 35–47)
HGB BLD-MCNC: 13.2 G/DL (ref 11.7–15.7)
IMM GRANULOCYTES # BLD: 0 10E3/UL
IMM GRANULOCYTES NFR BLD: 0 %
LYMPHOCYTES # BLD AUTO: 2.8 10E3/UL (ref 0.8–5.3)
LYMPHOCYTES NFR BLD AUTO: 45 %
MCH RBC QN AUTO: 29.4 PG (ref 26.5–33)
MCHC RBC AUTO-ENTMCNC: 31.5 G/DL (ref 31.5–36.5)
MCV RBC AUTO: 93 FL (ref 78–100)
MONOCYTES # BLD AUTO: 0.4 10E3/UL (ref 0–1.3)
MONOCYTES NFR BLD AUTO: 6 %
NEUTROPHILS # BLD AUTO: 2.9 10E3/UL (ref 1.6–8.3)
NEUTROPHILS NFR BLD AUTO: 46 %
NRBC # BLD AUTO: 0 10E3/UL
NRBC BLD AUTO-RTO: 0 /100
PLATELET # BLD AUTO: 280 10E3/UL (ref 150–450)
RBC # BLD AUTO: 4.49 10E6/UL (ref 3.8–5.2)
SPECIMEN EXPIRATION DATE: NORMAL
SPECIMEN EXPIRATION DATE: NORMAL
WBC # BLD AUTO: 6.3 10E3/UL (ref 4–11)

## 2023-04-28 PROCEDURE — 87491 CHLMYD TRACH DNA AMP PROBE: CPT | Mod: ORL | Performed by: FAMILY MEDICINE

## 2023-04-28 PROCEDURE — 86850 RBC ANTIBODY SCREEN: CPT | Mod: ORL | Performed by: FAMILY MEDICINE

## 2023-04-28 PROCEDURE — 86780 TREPONEMA PALLIDUM: CPT | Mod: ORL | Performed by: FAMILY MEDICINE

## 2023-04-28 PROCEDURE — 87591 N.GONORRHOEAE DNA AMP PROB: CPT | Mod: ORL | Performed by: FAMILY MEDICINE

## 2023-04-28 PROCEDURE — 86901 BLOOD TYPING SEROLOGIC RH(D): CPT | Mod: ORL | Performed by: FAMILY MEDICINE

## 2023-04-28 PROCEDURE — 86765 RUBEOLA ANTIBODY: CPT | Mod: ORL | Performed by: FAMILY MEDICINE

## 2023-04-28 PROCEDURE — 87389 HIV-1 AG W/HIV-1&-2 AB AG IA: CPT | Mod: ORL | Performed by: FAMILY MEDICINE

## 2023-04-28 PROCEDURE — 87086 URINE CULTURE/COLONY COUNT: CPT | Mod: ORL | Performed by: FAMILY MEDICINE

## 2023-04-28 PROCEDURE — 85025 COMPLETE CBC W/AUTO DIFF WBC: CPT | Mod: ORL | Performed by: FAMILY MEDICINE

## 2023-04-28 PROCEDURE — 87340 HEPATITIS B SURFACE AG IA: CPT | Mod: ORL | Performed by: FAMILY MEDICINE

## 2023-04-28 PROCEDURE — 86803 HEPATITIS C AB TEST: CPT | Mod: ORL | Performed by: FAMILY MEDICINE

## 2023-04-29 LAB
C TRACH DNA SPEC QL PROBE+SIG AMP: NEGATIVE
HBV SURFACE AG SERPL QL IA: NONREACTIVE
HCV AB SERPL QL IA: NONREACTIVE
HIV 1+2 AB+HIV1 P24 AG SERPL QL IA: NONREACTIVE
N GONORRHOEA DNA SPEC QL NAA+PROBE: NEGATIVE
T PALLIDUM AB SER QL: NONREACTIVE

## 2023-04-30 LAB — BACTERIA UR CULT: NORMAL

## 2023-05-01 LAB
MEV IGG SER IA-ACNC: >300 AU/ML
MEV IGG SER IA-ACNC: POSITIVE

## 2023-11-20 ENCOUNTER — LAB REQUISITION (OUTPATIENT)
Dept: LAB | Facility: CLINIC | Age: 33
End: 2023-11-20

## 2023-11-20 ENCOUNTER — TRANSFERRED RECORDS (OUTPATIENT)
Dept: HEALTH INFORMATION MANAGEMENT | Facility: CLINIC | Age: 33
End: 2023-11-20
Payer: COMMERCIAL

## 2023-11-20 DIAGNOSIS — O09.93 SUPERVISION OF HIGH RISK PREGNANCY, UNSPECIFIED, THIRD TRIMESTER: ICD-10-CM

## 2023-11-20 PROCEDURE — 87653 STREP B DNA AMP PROBE: CPT | Performed by: FAMILY MEDICINE

## 2023-11-20 PROCEDURE — 86780 TREPONEMA PALLIDUM: CPT | Performed by: FAMILY MEDICINE

## 2023-11-21 LAB
GP B STREP DNA SPEC QL NAA+PROBE: NEGATIVE
T PALLIDUM AB SER QL: NONREACTIVE

## 2023-12-11 PROBLEM — Z86.718 H/O BLOOD CLOTS: Status: ACTIVE | Noted: 2023-12-11

## 2023-12-11 PROBLEM — Z87.59 HISTORY OF PULMONARY EMBOLUS DURING PREGNANCY: Status: ACTIVE | Noted: 2023-12-11

## 2023-12-11 PROBLEM — Z3A.39 PREGNANCY WITH 39 COMPLETED WEEKS GESTATION: Status: ACTIVE | Noted: 2023-12-11

## 2023-12-11 PROBLEM — Z34.90 PREGNANCY: Status: ACTIVE | Noted: 2023-12-11

## 2023-12-11 PROBLEM — Z86.711 HISTORY OF PULMONARY EMBOLUS DURING PREGNANCY: Status: ACTIVE | Noted: 2023-12-11

## 2023-12-12 ENCOUNTER — ANESTHESIA EVENT (OUTPATIENT)
Dept: OBGYN | Facility: HOSPITAL | Age: 33
End: 2023-12-12
Payer: COMMERCIAL

## 2023-12-12 ENCOUNTER — ANESTHESIA (OUTPATIENT)
Dept: OBGYN | Facility: HOSPITAL | Age: 33
End: 2023-12-12
Payer: COMMERCIAL

## 2023-12-12 PROCEDURE — 250N000009 HC RX 250: Performed by: ANESTHESIOLOGY

## 2023-12-12 PROCEDURE — 370N000003 HC ANESTHESIA WARD SERVICE: Performed by: ANESTHESIOLOGY

## 2023-12-12 PROCEDURE — 250N000011 HC RX IP 250 OP 636: Mod: JZ | Performed by: ANESTHESIOLOGY

## 2023-12-12 RX ORDER — LIDOCAINE HYDROCHLORIDE AND EPINEPHRINE 15; 5 MG/ML; UG/ML
INJECTION, SOLUTION EPIDURAL PRN
Status: DISCONTINUED | OUTPATIENT
Start: 2023-12-12 | End: 2023-12-12

## 2023-12-12 RX ORDER — BUPIVACAINE HYDROCHLORIDE 2.5 MG/ML
INJECTION, SOLUTION EPIDURAL; INFILTRATION; INTRACAUDAL
Status: COMPLETED | OUTPATIENT
Start: 2023-12-12 | End: 2023-12-12

## 2023-12-12 RX ADMIN — LIDOCAINE HYDROCHLORIDE,EPINEPHRINE BITARTRATE 5 ML: 15; .005 INJECTION, SOLUTION EPIDURAL; INFILTRATION; INTRACAUDAL; PERINEURAL at 00:54

## 2023-12-12 RX ADMIN — BUPIVACAINE HYDROCHLORIDE 5 ML: 2.5 INJECTION, SOLUTION EPIDURAL; INFILTRATION; INTRACAUDAL at 01:00

## 2023-12-12 NOTE — ANESTHESIA POSTPROCEDURE EVALUATION
Patient: Zulay Rodriguez    Procedure: * No procedures listed *  Induction    Anesthesia Type:  Epidural    Note:  Disposition: Inpatient   Postop Pain Control: Uneventful            Sign Out: Well controlled pain   PONV: No   Neuro/Psych: Uneventful            Sign Out: Acceptable/Baseline neuro status   Airway/Respiratory: Uneventful            Sign Out: Acceptable/Baseline resp. status   CV/Hemodynamics: Uneventful            Sign Out: Acceptable CV status; No obvious hypovolemia; No obvious fluid overload   Other NRE:    DID A NON-ROUTINE EVENT OCCUR? No       Last vitals:  Vitals:    12/12/23 0732 12/12/23 0737 12/12/23 0742   BP:   103/57   Resp:   16   Temp:   36.7  C (98  F)   SpO2: 98% 99% 98%       Electronically Signed By: Arturo Orellana MD  December 12, 2023  11:11 AM

## 2023-12-12 NOTE — ANESTHESIA PREPROCEDURE EVALUATION
"Anesthesia Pre-Procedure Evaluation    Patient: Zulay Rodriguez   MRN: [id number] : 1990        Procedure :   Induction       Past Medical History:   Diagnosis Date    H/O blood clots 2019    H/O pulmonary embolus during pregnancy       History reviewed. No pertinent surgical history.   No Known Allergies   Social History     Tobacco Use    Smoking status: Never    Smokeless tobacco: Never   Substance Use Topics    Alcohol use: Yes     Comment: Alcoholic Drinks/day:  1-2 a week not during pregnancy      Wt Readings from Last 1 Encounters:   23 88.6 kg (195 lb 4.8 oz)        Anesthesia Evaluation   Pt has had prior anesthetic. Type: General and Regional.        ROS/MED HX  ENT/Pulmonary:       Neurologic:       Cardiovascular:     (+)  - -   -  - -   Taking blood thinners                                   METS/Exercise Tolerance:     Hematologic:     (+)  anticoagulation therapy,              Musculoskeletal:       GI/Hepatic:       Renal/Genitourinary:       Endo:       Psychiatric/Substance Use:       Infectious Disease:       Malignancy:       Other:      (+) Possibly pregnant, , ,         Physical Exam    Airway  airway exam normal      Mallampati: II   TM distance: > 3 FB   Neck ROM: full   Mouth opening: > 3 cm    Respiratory Devices and Support         Dental       (+) Minor Abnormalities - some fillings, tiny chips      Cardiovascular   cardiovascular exam normal       Rhythm and rate: regular and normal     Pulmonary   pulmonary exam normal            Other findings: Parturient in labor    OUTSIDE LABS:  CBC:   Lab Results   Component Value Date    WBC 9.2 2023    WBC 6.3 2023    HGB 10.6 (L) 2023    HGB 10.5 (L) 2023    HCT 32.1 (L) 2023    HCT 41.9 2023     2023     2023     BMP: No results found for: \"NA\", \"POTASSIUM\", \"CHLORIDE\", \"CO2\", \"BUN\", \"CR\", \"GLC\"  COAGS:   Lab Results   Component Value Date    INR 1.03 2023 " "    POC: No results found for: \"BGM\", \"HCG\", \"HCGS\"  HEPATIC: No results found for: \"ALBUMIN\", \"PROTTOTAL\", \"ALT\", \"AST\", \"GGT\", \"ALKPHOS\", \"BILITOTAL\", \"BILIDIRECT\", \"IRAIDA\"  OTHER: No results found for: \"PH\", \"LACT\", \"A1C\", \"NYLA\", \"PHOS\", \"MAG\", \"LIPASE\", \"AMYLASE\", \"TSH\", \"T4\", \"T3\", \"CRP\", \"SED\"    Anesthesia Plan    ASA Status:  3       Anesthesia Type: Epidural.              Consents            Postoperative Care            Comments:               Catrachita Burciaga MD    I have reviewed the pertinent notes and labs in the chart from the past 30 days and (re)examined the patient.  Any updates or changes from those notes are reflected in this note.            # Drug Induced Coagulation Defect: home medication list includes an anticoagulant medication       "

## 2024-08-27 ENCOUNTER — LAB REQUISITION (OUTPATIENT)
Dept: LAB | Facility: CLINIC | Age: 34
End: 2024-08-27

## 2024-08-27 DIAGNOSIS — Z01.419 ENCOUNTER FOR GYNECOLOGICAL EXAMINATION (GENERAL) (ROUTINE) WITHOUT ABNORMAL FINDINGS: ICD-10-CM

## 2024-08-27 DIAGNOSIS — Z13.220 ENCOUNTER FOR SCREENING FOR LIPOID DISORDERS: ICD-10-CM

## 2024-08-27 LAB
CHOLEST SERPL-MCNC: 187 MG/DL
FASTING STATUS PATIENT QL REPORTED: ABNORMAL
HDLC SERPL-MCNC: 49 MG/DL
LDLC SERPL CALC-MCNC: 114 MG/DL
NONHDLC SERPL-MCNC: 138 MG/DL
TRIGL SERPL-MCNC: 122 MG/DL

## 2024-08-27 PROCEDURE — 82465 ASSAY BLD/SERUM CHOLESTEROL: CPT | Performed by: FAMILY MEDICINE

## 2024-08-27 PROCEDURE — G0145 SCR C/V CYTO,THINLAYER,RESCR: HCPCS | Performed by: FAMILY MEDICINE

## 2024-08-27 PROCEDURE — 87624 HPV HI-RISK TYP POOLED RSLT: CPT | Performed by: FAMILY MEDICINE

## 2024-08-28 LAB
HPV HR 12 DNA CVX QL NAA+PROBE: NEGATIVE
HPV16 DNA CVX QL NAA+PROBE: NEGATIVE
HPV18 DNA CVX QL NAA+PROBE: NEGATIVE
HUMAN PAPILLOMA VIRUS FINAL DIAGNOSIS: NORMAL

## 2024-08-30 LAB
BKR AP ASSOCIATED HPV REPORT: NORMAL
BKR LAB AP GYN ADEQUACY: NORMAL
BKR LAB AP GYN INTERPRETATION: NORMAL
BKR LAB AP LMP: NORMAL
BKR LAB AP PREVIOUS ABNL DX: NORMAL
BKR LAB AP PREVIOUS ABNORMAL: NORMAL
PATH REPORT.COMMENTS IMP SPEC: NORMAL
PATH REPORT.COMMENTS IMP SPEC: NORMAL
PATH REPORT.RELEVANT HX SPEC: NORMAL

## 2024-09-28 ENCOUNTER — HEALTH MAINTENANCE LETTER (OUTPATIENT)
Age: 34
End: 2024-09-28

## 2025-01-30 ENCOUNTER — LAB REQUISITION (OUTPATIENT)
Dept: LAB | Facility: CLINIC | Age: 35
End: 2025-01-30

## 2025-01-30 DIAGNOSIS — R50.9 FEVER, UNSPECIFIED: ICD-10-CM

## 2025-01-30 PROCEDURE — 87081 CULTURE SCREEN ONLY: CPT

## 2025-02-01 LAB — BACTERIA SPEC CULT: NORMAL
